# Patient Record
(demographics unavailable — no encounter records)

---

## 2025-01-22 NOTE — PHYSICAL EXAM
[Well Developed] : well developed [Well Nourished] : well nourished [No Acute Distress] : no acute distress [Normal Conjunctiva] : normal conjunctiva [Normal Venous Pressure] : normal venous pressure [No Carotid Bruit] : no carotid bruit [Carotid Bruit] : carotid bruit [Normal] : normal [Rhythm Regular] : regular [Normal S1] : normal S1 [Normal S2] : normal S2 [II] : a grade 2 [Right Carotid Bruit] : right carotid bruit heard [Left Carotid Bruit] : left carotid bruit heard [Clear Lung Fields] : clear lung fields [Good Air Entry] : good air entry [No Respiratory Distress] : no respiratory distress  [Soft] : abdomen soft [Non Tender] : non-tender [No Masses/organomegaly] : no masses/organomegaly [Normal Bowel Sounds] : normal bowel sounds [Normal Gait] : normal gait [No Clubbing] : no clubbing [No Varicosities] : no varicosities [No Rash] : no rash [No Skin Lesions] : no skin lesions [Moves all extremities] : moves all extremities [No Focal Deficits] : no focal deficits [Normal Speech] : normal speech [Alert and Oriented] : alert and oriented [Normal memory] : normal memory [de-identified] : bruit bilat  [de-identified] : scaly non induarted rash RLE

## 2025-01-22 NOTE — REVIEW OF SYSTEMS
[SOB] : shortness of breath [Lower Ext Edema] : lower extremity edema [Negative] : Musculoskeletal [Leg Claudication] : no intermittent leg claudication [Syncope] : no syncope [de-identified] : venous stasis

## 2025-01-22 NOTE — ADDENDUM
[FreeTextEntry1] : Alvaro Rodriges assisted in documentation on Jan 22 2025 acting as a scribe for Dr. Gustavo Crow.

## 2025-01-22 NOTE — DISCUSSION/SUMMARY
[FreeTextEntry1] : AVR MVR  stable Echo MV no gradient AV pv 1.9 m/sec NO AI or MR  Lvh and DD noted  Meds reviewed  derm ref for rash RLE      Mitral Regurgitation: Currently, the condition is compensated. The diagnostic plan includes echocardiogram. AVR MVR. AVC meds reviwed  PPM no arrhythmias   ALONZO was instructed to follow-up in 4m month(s).    [EKG obtained to assist in diagnosis and management of assessed problem(s)] : EKG obtained to assist in diagnosis and management of assessed problem(s)

## 2025-01-22 NOTE — HISTORY OF PRESENT ILLNESS
[FreeTextEntry1] : Patient is a 79year-old white female is post aortic and mitral valve replacements with permanent pacemaker placed after postoperative heart block developed. Patient has had resistant hypertension which has been recently successfully treated with medications She is a reformed alcoholic with moderate weight gain as of late some 10 pounds with no overt symptoms of chest pain or dyspnea 5/19/20 s/p priro Bio AVR MVR and pM Had poorly controlled htn while using etoh now not using etoh with normalization of BP has periodic dyspnea with stairs and inclines 8/25/20 AVR MVR AND PPM has mod edema and baseline dyspnea walking limited due to bilat hip pain declines eval for surgery 9/29/20 generally feels well We receoved notice from PPM telem site of abnl settings of PPM reverted to VVI at high voltage output Has no overt symptoms 1/5/2021 mod obesity wants to consider ozempic for wgt loss no sscp or kevin no edema BP controlled 4/6/21 Baseline dyspnea no edema Bp controlled on meds occasional ecchymoses related to coumadin 7/13/21 mod back pain limiting walking mild edema no sscp 9/13/21 mod back pain and dyspnea w walking no relief with accupuncture or rehab no edema no sscp or arhythmias last INR 1.95 1/3/22 had elev cr 1.77 and diuretic reduced mild residual edema no sscp mod dyspnea with walking triggered by back pain 8/30/22 walking limited by back pain no edema on torsemide and hctz no orthopnea or pnd no arrhythmias noted 12/27/22 Denies Chest Pain, SOB, Dizziness, Leg edema, Orthopnea, PND, Palpitations, Syncope, KEVIN, Diaphoresis chronic back pain and baseline dyspnea pain in spine after standing for prolonged periods . 04/18/23 Denies Chest Pain, SOB, Dizziness, Leg edema, Orthopnea, PND, Palpitations, Syncope, KEVIN, Diaphoresis. ongoing weakens poor physical conditions no sob or kevin no response to rehab no benefit 08/16/23 limited by OA and muscle pain and fatigue  no kevin or chest pain  chronic back pain 01/09/24 mobility limited by back pain and spinal stenosis less edema in LE on lower norvasc 5mg  INR 2.8  getting infusion for gout Rx  has nighttime itching   5/7/24 walking limited by OA and back pain   1/22/25 S/P femur FX IN [facility] had repair  was in rehab and arrived home sept 2024  Had RLE cellulitis  pruritic at times  seen by HEME  INR 1.99  coumadin 3mg qd

## 2025-03-19 NOTE — REVIEW OF SYSTEMS
[SOB] : shortness of breath [Lower Ext Edema] : lower extremity edema [Negative] : Musculoskeletal [Leg Claudication] : no intermittent leg claudication [Syncope] : no syncope [de-identified] : venous stasis

## 2025-03-19 NOTE — PHYSICAL EXAM
[Well Developed] : well developed [Well Nourished] : well nourished [No Acute Distress] : no acute distress [Normal Conjunctiva] : normal conjunctiva [Normal Venous Pressure] : normal venous pressure [No Carotid Bruit] : no carotid bruit [Carotid Bruit] : carotid bruit [Normal] : normal [Rhythm Regular] : regular [Normal S1] : normal S1 [Normal S2] : normal S2 [II] : a grade 2 [Right Carotid Bruit] : right carotid bruit heard [Left Carotid Bruit] : left carotid bruit heard [Clear Lung Fields] : clear lung fields [Good Air Entry] : good air entry [No Respiratory Distress] : no respiratory distress  [Soft] : abdomen soft [Non Tender] : non-tender [No Masses/organomegaly] : no masses/organomegaly [Normal Bowel Sounds] : normal bowel sounds [Normal Gait] : normal gait [No Clubbing] : no clubbing [No Varicosities] : no varicosities [No Rash] : no rash [No Skin Lesions] : no skin lesions [Moves all extremities] : moves all extremities [No Focal Deficits] : no focal deficits [Normal Speech] : normal speech [Alert and Oriented] : alert and oriented [Normal memory] : normal memory [de-identified] : bruit bilat  [de-identified] : scaly non induarted rash RLE

## 2025-03-19 NOTE — HISTORY OF PRESENT ILLNESS
[FreeTextEntry1] : Patient is a 79year-old white female is post aortic and mitral valve replacements with permanent pacemaker placed after postoperative heart block developed. Patient has had resistant hypertension which has been recently successfully treated with medications She is a reformed alcoholic with moderate weight gain as of late some 10 pounds with no overt symptoms of chest pain or dyspnea 5/19/20 s/p priro Bio AVR MVR and pM Had poorly controlled htn while using etoh now not using etoh with normalization of BP has periodic dyspnea with stairs and inclines 8/25/20 AVR MVR AND PPM has mod edema and baseline dyspnea walking limited due to bilat hip pain declines eval for surgery 9/29/20 generally feels well We receoved notice from PPM telem site of abnl settings of PPM reverted to VVI at high voltage output Has no overt symptoms 1/5/2021 mod obesity wants to consider ozempic for wgt loss no sscp or kevin no edema BP controlled 4/6/21 Baseline dyspnea no edema Bp controlled on meds occasional ecchymoses related to coumadin 7/13/21 mod back pain limiting walking mild edema no sscp 9/13/21 mod back pain and dyspnea w walking no relief with accupuncture or rehab no edema no sscp or arhythmias last INR 1.95 1/3/22 had elev cr 1.77 and diuretic reduced mild residual edema no sscp mod dyspnea with walking triggered by back pain 8/30/22 walking limited by back pain no edema on torsemide and hctz no orthopnea or pnd no arrhythmias noted 12/27/22 Denies Chest Pain, SOB, Dizziness, Leg edema, Orthopnea, PND, Palpitations, Syncope, KEVIN, Diaphoresis chronic back pain and baseline dyspnea pain in spine after standing for prolonged periods . 04/18/23 Denies Chest Pain, SOB, Dizziness, Leg edema, Orthopnea, PND, Palpitations, Syncope, KEVIN, Diaphoresis. ongoing weakens poor physical conditions no sob or kevin no response to rehab no benefit 08/16/23 limited by OA and muscle pain and fatigue  no kevin or chest pain  chronic back pain 01/09/24 mobility limited by back pain and spinal stenosis less edema in LE on lower norvasc 5mg  INR 2.8  getting infusion for gout Rx  has nighttime itching   5/7/24 walking limited by OA and back pain   1/22/25 S/P femur FX IN [facility] had repair  was in rehab and arrived home sept 2024  Had RLE cellulitis  pruritic at times  seen by HEME  INR 1.99  coumadin 3mg qd  3/19/25  recent spike in Cr 2.9  was in [facility] and rehab 6/24- 9/24 was told she had high cr is on ARB torsemide and HCTZ  was rx for celluitis in [place] and was on ABX ? name  compltetd abx 3 week

## 2025-03-19 NOTE — DISCUSSION/SUMMARY
[FreeTextEntry1] :  Patient is an 84-year-old white female with prior history of aortic valve and mitral valve replacement along with a pacemaker who is on Coumadin for atrial fibrillation.  Patient spent a big chunk of 2024 recovering from a leg fracture more recently she was on 2 agents for underlying cellulitis.  During that period her creatinine tal from about 1.9 in February 2020 24-2.24 in May 2020 24-2.18 in December 2024 and more recently in March 2024 tal to 2.9.  She has no current signs of leg edema shortness of breath blood pressure seems to be well-controlled unclear of the etiology of her azotemia certainly dehydration from her diuretics and her angiotensin receptor blockers may be at fault those medicines were held for the time being repeat creatinine and urinalysis were obtained we will follow her blood pressure off the angiotensin receptor blocker off the hydrochlorothiazide and off the diuretic to see if there is any signs of fluid retention or hypertension.  Fluid hydration of about 2-1/2 to 3 L/day was recommended. [EKG obtained to assist in diagnosis and management of assessed problem(s)] : EKG obtained to assist in diagnosis and management of assessed problem(s)

## 2025-04-02 NOTE — REVIEW OF SYSTEMS
[SOB] : shortness of breath [Lower Ext Edema] : lower extremity edema [Negative] : Musculoskeletal [Leg Claudication] : no intermittent leg claudication [Syncope] : no syncope [de-identified] : venous stasis

## 2025-04-02 NOTE — PHYSICAL EXAM
[Well Developed] : well developed [Well Nourished] : well nourished [No Acute Distress] : no acute distress [Normal Conjunctiva] : normal conjunctiva [Normal Venous Pressure] : normal venous pressure [No Carotid Bruit] : no carotid bruit [Carotid Bruit] : carotid bruit [Normal] : normal [Rhythm Regular] : regular [Normal S1] : normal S1 [Normal S2] : normal S2 [II] : a grade 2 [Right Carotid Bruit] : right carotid bruit heard [Left Carotid Bruit] : left carotid bruit heard [Clear Lung Fields] : clear lung fields [Good Air Entry] : good air entry [No Respiratory Distress] : no respiratory distress  [Soft] : abdomen soft [Non Tender] : non-tender [No Masses/organomegaly] : no masses/organomegaly [Normal Bowel Sounds] : normal bowel sounds [Normal Gait] : normal gait [No Clubbing] : no clubbing [No Varicosities] : no varicosities [No Rash] : no rash [No Skin Lesions] : no skin lesions [Moves all extremities] : moves all extremities [No Focal Deficits] : no focal deficits [Normal Speech] : normal speech [Alert and Oriented] : alert and oriented [Normal memory] : normal memory [de-identified] : bruit bilat  [de-identified] : scaly non induarted rash RLE

## 2025-04-02 NOTE — ADDENDUM
[FreeTextEntry1] : Alvaro Rodriges assisted in documentation on Apr 2 2025 acting as a scribe for Dr. Gustavo Corw.

## 2025-04-02 NOTE — DISCUSSION/SUMMARY
[EKG obtained to assist in diagnosis and management of assessed problem(s)] : EKG obtained to assist in diagnosis and management of assessed problem(s) [FreeTextEntry1] :  Patient was seen approximately 2 weeks ago and had her diuretics held due to moderate azotemia since that point and fortunately she has had some moderate leg with fluid retention with weight gain no dyspnea was reported.  Patient's leg edema follows configuration suggestive of venous insufficiency with chronic venous stasis noted at the bottom of her right leg more than her left leg.  We will try to reinstitute torsemide at 20 mg 3 times a week so as to avoid any significant renal insufficiency INR was taken today at 3.2

## 2025-04-02 NOTE — HISTORY OF PRESENT ILLNESS
[FreeTextEntry1] : Patient is a 79year-old white female is post aortic and mitral valve replacements with permanent pacemaker placed after postoperative heart block developed. Patient has had resistant hypertension which has been recently successfully treated with medications She is a reformed alcoholic with moderate weight gain as of late some 10 pounds with no overt symptoms of chest pain or dyspnea 5/19/20 s/p priro Bio AVR MVR and pM Had poorly controlled htn while using etoh now not using etoh with normalization of BP has periodic dyspnea with stairs and inclines 8/25/20 AVR MVR AND PPM has mod edema and baseline dyspnea walking limited due to bilat hip pain declines eval for surgery 9/29/20 generally feels well We receoved notice from PPM telem site of abnl settings of PPM reverted to VVI at high voltage output Has no overt symptoms 1/5/2021 mod obesity wants to consider ozempic for wgt loss no sscp or kevin no edema BP controlled 4/6/21 Baseline dyspnea no edema Bp controlled on meds occasional ecchymoses related to coumadin 7/13/21 mod back pain limiting walking mild edema no sscp 9/13/21 mod back pain and dyspnea w walking no relief with accupuncture or rehab no edema no sscp or arhythmias last INR 1.95 1/3/22 had elev cr 1.77 and diuretic reduced mild residual edema no sscp mod dyspnea with walking triggered by back pain 8/30/22 walking limited by back pain no edema on torsemide and hctz no orthopnea or pnd no arrhythmias noted 12/27/22 Denies Chest Pain, SOB, Dizziness, Leg edema, Orthopnea, PND, Palpitations, Syncope, KEVIN, Diaphoresis chronic back pain and baseline dyspnea pain in spine after standing for prolonged periods . 04/18/23 Denies Chest Pain, SOB, Dizziness, Leg edema, Orthopnea, PND, Palpitations, Syncope, KEVIN, Diaphoresis. ongoing weakens poor physical conditions no sob or kevin no response to rehab no benefit 08/16/23 limited by OA and muscle pain and fatigue  no kevin or chest pain  chronic back pain 01/09/24 mobility limited by back pain and spinal stenosis less edema in LE on lower norvasc 5mg  INR 2.8  getting infusion for gout Rx  has nighttime itching   5/7/24 walking limited by OA and back pain   1/22/25 S/P femur FX IN [facility] had repair  was in rehab and arrived home sept 2024  Had RLE cellulitis  pruritic at times  seen by HEME  INR 1.99  coumadin 3mg qd  3/19/25  recent spike in Cr 2.9  was in [facility] and rehab 6/24- 9/24 was told she had high cr is on ARB torsemide and HCTZ  was rx for celluitis in [facility] and was on ABX ? name  compltetd abx 3 week   4/2/25 leg edema developed while off torsemide  wgt gain off torsemide

## 2025-04-30 NOTE — ADDENDUM
[FreeTextEntry1] : Alvaro Rodriges assisted in documentation on Apr 30 2025 acting as a scribe for Dr. Gustavo Crow.

## 2025-04-30 NOTE — HISTORY OF PRESENT ILLNESS
[FreeTextEntry1] : Patient is a 79year-old white female is post aortic and mitral valve replacements with permanent pacemaker placed after postoperative heart block developed. Patient has had resistant hypertension which has been recently successfully treated with medications She is a reformed alcoholic with moderate weight gain as of late some 10 pounds with no overt symptoms of chest pain or dyspnea 5/19/20 s/p priro Bio AVR MVR and pM Had poorly controlled htn while using etoh now not using etoh with normalization of BP has periodic dyspnea with stairs and inclines 8/25/20 AVR MVR AND PPM has mod edema and baseline dyspnea walking limited due to bilat hip pain declines eval for surgery 9/29/20 generally feels well We receoved notice from PPM telem site of abnl settings of PPM reverted to VVI at high voltage output Has no overt symptoms 1/5/2021 mod obesity wants to consider ozempic for wgt loss no sscp or kevin no edema BP controlled 4/6/21 Baseline dyspnea no edema Bp controlled on meds occasional ecchymoses related to coumadin 7/13/21 mod back pain limiting walking mild edema no sscp 9/13/21 mod back pain and dyspnea w walking no relief with accupuncture or rehab no edema no sscp or arhythmias last INR 1.95 1/3/22 had elev cr 1.77 and diuretic reduced mild residual edema no sscp mod dyspnea with walking triggered by back pain 8/30/22 walking limited by back pain no edema on torsemide and hctz no orthopnea or pnd no arrhythmias noted 12/27/22 Denies Chest Pain, SOB, Dizziness, Leg edema, Orthopnea, PND, Palpitations, Syncope, KEVIN, Diaphoresis chronic back pain and baseline dyspnea pain in spine after standing for prolonged periods . 04/18/23 Denies Chest Pain, SOB, Dizziness, Leg edema, Orthopnea, PND, Palpitations, Syncope, KEVIN, Diaphoresis. ongoing weakens poor physical conditions no sob or kevin no response to rehab no benefit 08/16/23 limited by OA and muscle pain and fatigue  no kevin or chest pain  chronic back pain 01/09/24 mobility limited by back pain and spinal stenosis less edema in LE on lower norvasc 5mg  INR 2.8  getting infusion for gout Rx  has nighttime itching   5/7/24 walking limited by OA and back pain   1/22/25 S/P femur FX IN [facility] had repair  was in rehab and arrived home sept 2024  Had RLE cellulitis  pruritic at times  seen by HEME  INR 1.99  coumadin 3mg qd  3/19/25  recent spike in Cr 2.9  was in [facility] and rehab 6/24- 9/24 was told she had high cr is on ARB torsemide and HCTZ  was rx for celluitis in [facility] and was on ABX ? name  compltetd abx 3 week   4/2/25 leg edema developed while off torsemide  wgt gain off torsemide   4/30/25  passed away 2 weeks ago poor sleeping habbits bilateral leg edema mild improvement erythema and right le skin peeling  no dermatology f/u

## 2025-04-30 NOTE — REVIEW OF SYSTEMS
[SOB] : shortness of breath [Lower Ext Edema] : lower extremity edema [Negative] : Musculoskeletal [Leg Claudication] : no intermittent leg claudication [Syncope] : no syncope [de-identified] : venous stasis

## 2025-04-30 NOTE — PHYSICAL EXAM
[Well Developed] : well developed [Well Nourished] : well nourished [No Acute Distress] : no acute distress [Normal Conjunctiva] : normal conjunctiva [Normal Venous Pressure] : normal venous pressure [No Carotid Bruit] : no carotid bruit [Carotid Bruit] : carotid bruit [Normal] : normal [Rhythm Regular] : regular [Normal S1] : normal S1 [Normal S2] : normal S2 [II] : a grade 2 [Right Carotid Bruit] : right carotid bruit heard [Left Carotid Bruit] : left carotid bruit heard [Clear Lung Fields] : clear lung fields [Good Air Entry] : good air entry [No Respiratory Distress] : no respiratory distress  [Soft] : abdomen soft [Non Tender] : non-tender [No Masses/organomegaly] : no masses/organomegaly [Normal Bowel Sounds] : normal bowel sounds [Normal Gait] : normal gait [No Clubbing] : no clubbing [No Varicosities] : no varicosities [No Rash] : no rash [No Skin Lesions] : no skin lesions [Moves all extremities] : moves all extremities [No Focal Deficits] : no focal deficits [Normal Speech] : normal speech [Alert and Oriented] : alert and oriented [Normal memory] : normal memory [de-identified] : bruit bilat  [de-identified] : scaly non induarted rash RLE

## 2025-07-23 NOTE — PHYSICAL EXAM
[Well Developed] : well developed [Well Nourished] : well nourished [No Acute Distress] : no acute distress [Normal Conjunctiva] : normal conjunctiva [Normal Venous Pressure] : normal venous pressure [No Carotid Bruit] : no carotid bruit [Carotid Bruit] : carotid bruit [Normal] : normal [Rhythm Regular] : regular [Normal S1] : normal S1 [Normal S2] : normal S2 [II] : a grade 2 [Right Carotid Bruit] : right carotid bruit heard [Left Carotid Bruit] : left carotid bruit heard [Clear Lung Fields] : clear lung fields [Good Air Entry] : good air entry [No Respiratory Distress] : no respiratory distress  [Soft] : abdomen soft [Non Tender] : non-tender [No Masses/organomegaly] : no masses/organomegaly [Normal Bowel Sounds] : normal bowel sounds [Normal Gait] : normal gait [No Clubbing] : no clubbing [No Varicosities] : no varicosities [No Rash] : no rash [No Skin Lesions] : no skin lesions [Moves all extremities] : moves all extremities [No Focal Deficits] : no focal deficits [Normal Speech] : normal speech [Alert and Oriented] : alert and oriented [Normal memory] : normal memory [de-identified] : bruit bilat  [de-identified] : scaly non induarted rash RLE

## 2025-07-23 NOTE — ADDENDUM
[FreeTextEntry1] : Alvaro Rodriges assisted in documentation on Jul 23 2025 acting as a scribe for Dr. Gustavo Crow.

## 2025-07-23 NOTE — REVIEW OF SYSTEMS
[SOB] : shortness of breath [Lower Ext Edema] : lower extremity edema [Negative] : Musculoskeletal [Leg Claudication] : no intermittent leg claudication [Syncope] : no syncope [de-identified] : venous stasis

## 2025-07-23 NOTE — HISTORY OF PRESENT ILLNESS
[FreeTextEntry1] : Patient is a 79year-old white female is post aortic and mitral valve replacements with permanent pacemaker placed after postoperative heart block developed. Patient has had resistant hypertension which has been recently successfully treated with medications She is a reformed alcoholic with moderate weight gain as of late some 10 pounds with no overt symptoms of chest pain or dyspnea 5/19/20 s/p priro Bio AVR MVR and pM Had poorly controlled htn while using etoh now not using etoh with normalization of BP has periodic dyspnea with stairs and inclines 8/25/20 AVR MVR AND PPM has mod edema and baseline dyspnea walking limited due to bilat hip pain declines eval for surgery 9/29/20 generally feels well We receoved notice from PPM telem site of abnl settings of PPM reverted to VVI at high voltage output Has no overt symptoms 1/5/2021 mod obesity wants to consider ozempic for wgt loss no sscp or kevin no edema BP controlled 4/6/21 Baseline dyspnea no edema Bp controlled on meds occasional ecchymoses related to coumadin 7/13/21 mod back pain limiting walking mild edema no sscp 9/13/21 mod back pain and dyspnea w walking no relief with accupuncture or rehab no edema no sscp or arhythmias last INR 1.95 1/3/22 had elev cr 1.77 and diuretic reduced mild residual edema no sscp mod dyspnea with walking triggered by back pain 8/30/22 walking limited by back pain no edema on torsemide and hctz no orthopnea or pnd no arrhythmias noted 12/27/22 Denies Chest Pain, SOB, Dizziness, Leg edema, Orthopnea, PND, Palpitations, Syncope, KEVIN, Diaphoresis chronic back pain and baseline dyspnea pain in spine after standing for prolonged periods . 04/18/23 Denies Chest Pain, SOB, Dizziness, Leg edema, Orthopnea, PND, Palpitations, Syncope, KEVIN, Diaphoresis. ongoing weakens poor physical conditions no sob or kevin no response to rehab no benefit 08/16/23 limited by OA and muscle pain and fatigue  no kevin or chest pain  chronic back pain 01/09/24 mobility limited by back pain and spinal stenosis less edema in LE on lower norvasc 5mg  INR 2.8  getting infusion for gout Rx  has nighttime itching   5/7/24 walking limited by OA and back pain   1/22/25 S/P femur FX IN [facility] had repair  was in rehab and arrived home sept 2024  Had RLE cellulitis  pruritic at times  seen by HEME  INR 1.99  coumadin 3mg qd  3/19/25  recent spike in Cr 2.9  was in [facility] and rehab 6/24- 9/24 was told she had high cr is on ARB torsemide and HCTZ  was rx for cellulitis in [facility] and was on ABX ? name  complotted abx 3 week   4/2/25 leg edema developed while off torsemide  wgt gain off torsemide   4/30/25  passed away 2 weeks ago poor sleeping habits bilateral leg edema mild improvement erythema and right le skin peeling  no dermatology f/u  7/23/25 was on lower dose of torsemide and swelling resumed had baseline lymphedema and Venous insuff has RLE > LLE with mild ulceration